# Patient Record
Sex: MALE | NOT HISPANIC OR LATINO | URBAN - NONMETROPOLITAN AREA
[De-identification: names, ages, dates, MRNs, and addresses within clinical notes are randomized per-mention and may not be internally consistent; named-entity substitution may affect disease eponyms.]

---

## 2021-12-22 ENCOUNTER — IMPORTED ENCOUNTER (OUTPATIENT)
Dept: URBAN - NONMETROPOLITAN AREA CLINIC 1 | Facility: CLINIC | Age: 56
End: 2021-12-22

## 2021-12-22 PROBLEM — H25.13: Noted: 2021-12-22

## 2021-12-22 PROBLEM — H52.4: Noted: 2021-12-22

## 2021-12-22 PROBLEM — H47.011: Noted: 2021-12-22

## 2021-12-22 PROBLEM — H16.223: Noted: 2021-12-22

## 2021-12-22 PROCEDURE — 99204 OFFICE O/P NEW MOD 45 MIN: CPT

## 2021-12-22 PROCEDURE — 92015 DETERMINE REFRACTIVE STATE: CPT

## 2021-12-22 NOTE — PATIENT DISCUSSION
LAST OUDiscussed diagnosis with patient. Discussed the episodic nature of the condition and the various treatment options with patient. Patient reports no relief with Refresh. Start Restasis BID OU hand written Rx given today. Continue to monitor. [place] OUDiscussed diagnosis in detail with patient. Reviewed symptoms related to cataract progression. No treatment required at this time. Continue to monitor. Hx of NAION ODDiscussed diagnosis in detail with patient. Will continue to monitor for changes. Presbyopia OUDiscussed refractive status in detail with patient. New glasses Rx given today. Continue to monitor.

## 2022-04-10 ASSESSMENT — TONOMETRY
OS_IOP_MMHG: 13
OD_IOP_MMHG: 13

## 2022-04-10 ASSESSMENT — VISUAL ACUITY
OS_CC: 20/20-2
OD_CC: 20/20-2